# Patient Record
Sex: FEMALE | Race: WHITE
[De-identification: names, ages, dates, MRNs, and addresses within clinical notes are randomized per-mention and may not be internally consistent; named-entity substitution may affect disease eponyms.]

---

## 2017-08-24 ENCOUNTER — HOSPITAL ENCOUNTER (OUTPATIENT)
Dept: HOSPITAL 19 - COL.CARD | Age: 62
End: 2017-08-24
Attending: INTERNAL MEDICINE
Payer: MEDICAID

## 2017-08-24 VITALS — SYSTOLIC BLOOD PRESSURE: 167 MMHG | HEART RATE: 137 BPM | DIASTOLIC BLOOD PRESSURE: 86 MMHG

## 2017-08-24 VITALS — DIASTOLIC BLOOD PRESSURE: 77 MMHG | HEART RATE: 106 BPM | SYSTOLIC BLOOD PRESSURE: 128 MMHG

## 2017-08-24 VITALS — BODY MASS INDEX: 26.87 KG/M2 | WEIGHT: 198.42 LBS | HEIGHT: 72 IN

## 2017-08-24 VITALS — HEART RATE: 122 BPM | SYSTOLIC BLOOD PRESSURE: 148 MMHG | DIASTOLIC BLOOD PRESSURE: 80 MMHG

## 2017-08-24 VITALS — DIASTOLIC BLOOD PRESSURE: 82 MMHG | SYSTOLIC BLOOD PRESSURE: 131 MMHG | HEART RATE: 105 BPM

## 2017-08-24 DIAGNOSIS — Z01.810: Primary | ICD-10-CM

## 2017-08-24 PROCEDURE — A9502 TC99M TETROFOSMIN: HCPCS

## 2018-01-16 VITALS — HEART RATE: 88 BPM | SYSTOLIC BLOOD PRESSURE: 165 MMHG | DIASTOLIC BLOOD PRESSURE: 83 MMHG | TEMPERATURE: 97.8 F

## 2018-01-16 VITALS — DIASTOLIC BLOOD PRESSURE: 73 MMHG | TEMPERATURE: 98.3 F | HEART RATE: 88 BPM | SYSTOLIC BLOOD PRESSURE: 110 MMHG

## 2018-01-18 VITALS — HEART RATE: 80 BPM | SYSTOLIC BLOOD PRESSURE: 85 MMHG | TEMPERATURE: 98.1 F | DIASTOLIC BLOOD PRESSURE: 50 MMHG

## 2018-01-18 LAB
ANISOCYTOSIS BLD QL: (no result)
EOSINOPHIL NFR BLD: 7 % (ref 0–4)
ERYTHROCYTE [DISTWIDTH] IN BLOOD BY AUTOMATED COUNT: 14.8 % (ref 11.5–14.5)
HCT VFR BLD AUTO: 42.5 % (ref 37–47)
HGB BLD-MCNC: 14.2 G/DL (ref 12.5–16)
LYMPHOCYTES NFR BLD MANUAL: 31 % (ref 20–51)
MACROCYTES BLD QL SMEAR: (no result)
MCH RBC QN AUTO: 34 PG (ref 27–31)
MCHC RBC AUTO-ENTMCNC: 33 G/DL (ref 33–37)
MCV RBC AUTO: 100 FL (ref 80–100)
MONOCYTES NFR BLD: 1 % (ref 1.7–9.3)
NEUTS BAND NFR BLD: 7 % (ref 0–10)
NEUTS SEG NFR BLD MANUAL: 54 % (ref 42–75.2)
PLATELET # BLD AUTO: 253 K/MM3 (ref 130–400)
PLATELET BLD QL SMEAR: NORMAL
PMV BLD AUTO: 9.6 FL (ref 7.4–10.4)
RBC # BLD AUTO: 4.24 M/MM3 (ref 4.1–5.3)

## 2018-01-22 VITALS — TEMPERATURE: 98.3 F | DIASTOLIC BLOOD PRESSURE: 61 MMHG | SYSTOLIC BLOOD PRESSURE: 98 MMHG | HEART RATE: 92 BPM

## 2018-01-26 VITALS — TEMPERATURE: 98.3 F | SYSTOLIC BLOOD PRESSURE: 105 MMHG | DIASTOLIC BLOOD PRESSURE: 65 MMHG | HEART RATE: 88 BPM

## 2018-01-26 LAB
ALBUMIN SERPL-MCNC: 3.3 GM/DL (ref 3.5–5)
ALP SERPL-CCNC: 69 U/L (ref 50–136)
ALT SERPL-CCNC: 15 U/L (ref 9–52)
ANION GAP SERPL CALC-SCNC: 6 MMOL/L (ref 7–16)
AST SERPL-CCNC: 19 U/L (ref 15–37)
BILIRUB SERPL-MCNC: 0.4 MG/DL (ref 0–1)
BUN SERPL-MCNC: 6 MG/DL (ref 7–17)
CALCIUM SERPL-MCNC: 8.8 MG/DL (ref 8.4–10.2)
CHLORIDE SERPL-SCNC: 102 MMOL/L (ref 98–107)
CO2 SERPL-SCNC: 25 MMOL/L (ref 22–30)
CREAT SERPL-SCNC: 0.51 MG/DL (ref 0.52–1.25)
EOSINOPHIL NFR BLD: 3 % (ref 0–4)
ERYTHROCYTE [DISTWIDTH] IN BLOOD BY AUTOMATED COUNT: 15.3 % (ref 11.5–14.5)
GLUCOSE SERPL-MCNC: 101 MG/DL (ref 74–106)
HCT VFR BLD AUTO: 39.9 % (ref 37–47)
HGB BLD-MCNC: 13.5 G/DL (ref 12.5–16)
LDH SERPL-CCNC: 515 U/L (ref 313–618)
LYMPHOCYTES NFR BLD MANUAL: 34 % (ref 20–51)
MCH RBC QN AUTO: 34 PG (ref 27–31)
MCHC RBC AUTO-ENTMCNC: 34 G/DL (ref 33–37)
MCV RBC AUTO: 100 FL (ref 80–100)
MONOCYTES NFR BLD: 3 % (ref 1.7–9.3)
NEUTS BAND NFR BLD: 4 % (ref 0–10)
NEUTS SEG NFR BLD MANUAL: 56 % (ref 42–75.2)
PLATELET # BLD AUTO: 173 K/MM3 (ref 130–400)
PLATELET BLD QL SMEAR: NORMAL
PMV BLD AUTO: 9.2 FL (ref 7.4–10.4)
POTASSIUM SERPL-SCNC: 4.6 MMOL/L (ref 3.4–5)
PROT SERPL-MCNC: 6.3 GM/DL (ref 6.4–8.2)
RBC # BLD AUTO: 4.01 M/MM3 (ref 4.1–5.3)
SODIUM SERPL-SCNC: 133 MMOL/L (ref 137–145)

## 2018-02-02 VITALS — HEART RATE: 83 BPM | DIASTOLIC BLOOD PRESSURE: 59 MMHG | SYSTOLIC BLOOD PRESSURE: 99 MMHG | TEMPERATURE: 98.2 F

## 2018-02-02 LAB
BASOPHILS NFR BLD MANUAL: 2 % (ref 0–2)
EOSINOPHIL NFR BLD: 2 % (ref 0–4)
ERYTHROCYTE [DISTWIDTH] IN BLOOD BY AUTOMATED COUNT: 15.8 % (ref 11.5–14.5)
HCT VFR BLD AUTO: 37.2 % (ref 37–47)
HGB BLD-MCNC: 12.7 G/DL (ref 12.5–16)
LYMPHOCYTES NFR BLD MANUAL: 20 % (ref 20–51)
MCH RBC QN AUTO: 34 PG (ref 27–31)
MCHC RBC AUTO-ENTMCNC: 34 G/DL (ref 33–37)
MCV RBC AUTO: 100 FL (ref 80–100)
MONOCYTES NFR BLD: 4 % (ref 1.7–9.3)
NEUTS BAND NFR BLD: 26 % (ref 0–10)
NEUTS SEG NFR BLD MANUAL: 46 % (ref 42–75.2)
PLATELET # BLD AUTO: 237 K/MM3 (ref 130–400)
PLATELET BLD QL SMEAR: NORMAL
PMV BLD AUTO: 9.3 FL (ref 7.4–10.4)
RBC # BLD AUTO: 3.72 M/MM3 (ref 4.1–5.3)

## 2018-02-16 VITALS — SYSTOLIC BLOOD PRESSURE: 178 MMHG | HEART RATE: 110 BPM | DIASTOLIC BLOOD PRESSURE: 90 MMHG | TEMPERATURE: 98 F

## 2018-02-16 LAB
ALBUMIN SERPL-MCNC: 3.5 GM/DL (ref 3.5–5)
ALP SERPL-CCNC: 69 U/L (ref 50–136)
ALT SERPL-CCNC: 19 U/L (ref 9–52)
ANION GAP SERPL CALC-SCNC: 5 MMOL/L (ref 7–16)
AST SERPL-CCNC: 14 U/L (ref 15–37)
BASOPHILS NFR BLD MANUAL: 1 % (ref 0–2)
BILIRUB SERPL-MCNC: 0.6 MG/DL (ref 0–1)
BUN SERPL-MCNC: 9 MG/DL (ref 7–17)
CALCIUM SERPL-MCNC: 8.6 MG/DL (ref 8.4–10.2)
CHLORIDE SERPL-SCNC: 101 MMOL/L (ref 98–107)
CO2 SERPL-SCNC: 27 MMOL/L (ref 22–30)
CREAT SERPL-SCNC: 0.49 MG/DL (ref 0.52–1.25)
EOSINOPHIL NFR BLD: 1 % (ref 0–4)
ERYTHROCYTE [DISTWIDTH] IN BLOOD BY AUTOMATED COUNT: 16 % (ref 11.5–14.5)
GLUCOSE SERPL-MCNC: 91 MG/DL (ref 74–106)
HCT VFR BLD AUTO: 39.6 % (ref 37–47)
HGB BLD-MCNC: 13.2 G/DL (ref 12.5–16)
LDH SERPL-CCNC: 401 U/L (ref 313–618)
LYMPHOCYTES NFR BLD MANUAL: 13 % (ref 20–51)
MCH RBC QN AUTO: 34 PG (ref 27–31)
MCHC RBC AUTO-ENTMCNC: 33 G/DL (ref 33–37)
MCV RBC AUTO: 101 FL (ref 80–100)
MONOCYTES NFR BLD: 4 % (ref 1.7–9.3)
NEUTS BAND NFR BLD: 13 % (ref 0–10)
NEUTS SEG NFR BLD MANUAL: 68 % (ref 42–75.2)
PLATELET # BLD AUTO: 219 K/MM3 (ref 130–400)
PLATELET BLD QL SMEAR: NORMAL
PMV BLD AUTO: 9 FL (ref 7.4–10.4)
POTASSIUM SERPL-SCNC: 4.3 MMOL/L (ref 3.4–5)
PROT SERPL-MCNC: 6.6 GM/DL (ref 6.4–8.2)
RBC # BLD AUTO: 3.93 M/MM3 (ref 4.1–5.3)
SODIUM SERPL-SCNC: 133 MMOL/L (ref 137–145)

## 2018-02-23 VITALS — SYSTOLIC BLOOD PRESSURE: 112 MMHG | TEMPERATURE: 98.3 F | DIASTOLIC BLOOD PRESSURE: 68 MMHG | HEART RATE: 92 BPM

## 2018-03-02 VITALS — TEMPERATURE: 98 F | DIASTOLIC BLOOD PRESSURE: 65 MMHG | SYSTOLIC BLOOD PRESSURE: 110 MMHG | HEART RATE: 101 BPM

## 2018-03-02 LAB
ALBUMIN SERPL-MCNC: 3 GM/DL (ref 3.5–5)
ALP SERPL-CCNC: 73 U/L (ref 50–136)
ALT SERPL-CCNC: 18 U/L (ref 9–52)
ANION GAP SERPL CALC-SCNC: 5 MMOL/L (ref 7–16)
ANISOCYTOSIS BLD QL: (no result)
AST SERPL-CCNC: 17 U/L (ref 15–37)
BILIRUB SERPL-MCNC: 0.4 MG/DL (ref 0–1)
BUN SERPL-MCNC: 7 MG/DL (ref 7–17)
CALCIUM SERPL-MCNC: 8.3 MG/DL (ref 8.4–10.2)
CHLORIDE SERPL-SCNC: 102 MMOL/L (ref 98–107)
CO2 SERPL-SCNC: 27 MMOL/L (ref 22–30)
CREAT SERPL-SCNC: 0.46 MG/DL (ref 0.52–1.25)
ERYTHROCYTE [DISTWIDTH] IN BLOOD BY AUTOMATED COUNT: 16.5 % (ref 11.5–14.5)
GLUCOSE SERPL-MCNC: 90 MG/DL (ref 74–106)
HCT VFR BLD AUTO: 37.8 % (ref 37–47)
HGB BLD-MCNC: 12.8 G/DL (ref 12.5–16)
LDH SERPL-CCNC: 490 U/L (ref 313–618)
LYMPHOCYTES NFR BLD MANUAL: 13 % (ref 20–51)
MACROCYTES BLD QL SMEAR: (no result)
MCH RBC QN AUTO: 34 PG (ref 27–31)
MCHC RBC AUTO-ENTMCNC: 34 G/DL (ref 33–37)
MCV RBC AUTO: 102 FL (ref 80–100)
NEUTS BAND NFR BLD: 9 % (ref 0–10)
NEUTS SEG NFR BLD MANUAL: 78 % (ref 42–75.2)
OVALOCYTES BLD QL SMEAR: (no result)
PLATELET # BLD AUTO: 230 K/MM3 (ref 130–400)
PLATELET BLD QL SMEAR: NORMAL
PMV BLD AUTO: 9.2 FL (ref 7.4–10.4)
POTASSIUM SERPL-SCNC: 4.8 MMOL/L (ref 3.4–5)
PROT SERPL-MCNC: 6.1 GM/DL (ref 6.4–8.2)
RBC # BLD AUTO: 3.72 M/MM3 (ref 4.1–5.3)
SODIUM SERPL-SCNC: 133 MMOL/L (ref 137–145)

## 2018-03-09 VITALS — SYSTOLIC BLOOD PRESSURE: 108 MMHG | HEART RATE: 81 BPM | DIASTOLIC BLOOD PRESSURE: 60 MMHG | TEMPERATURE: 97 F

## 2018-03-09 LAB
ANISOCYTOSIS BLD QL: (no result)
EOSINOPHIL NFR BLD: 6 % (ref 0–4)
ERYTHROCYTE [DISTWIDTH] IN BLOOD BY AUTOMATED COUNT: 16.6 % (ref 11.5–14.5)
HCT VFR BLD AUTO: 37.7 % (ref 37–47)
HGB BLD-MCNC: 12.7 G/DL (ref 12.5–16)
LYMPHOCYTES NFR BLD MANUAL: 14 % (ref 20–51)
MCH RBC QN AUTO: 34 PG (ref 27–31)
MCHC RBC AUTO-ENTMCNC: 34 G/DL (ref 33–37)
MCV RBC AUTO: 102 FL (ref 80–100)
MONOCYTES NFR BLD: 17 % (ref 1.7–9.3)
NEUTS BAND NFR BLD: 10 % (ref 0–10)
NEUTS SEG NFR BLD MANUAL: 53 % (ref 42–75.2)
PLATELET # BLD AUTO: 269 K/MM3 (ref 130–400)
PLATELET BLD QL SMEAR: NORMAL
PMV BLD AUTO: 9.2 FL (ref 7.4–10.4)
RBC # BLD AUTO: 3.7 M/MM3 (ref 4.1–5.3)

## 2018-03-16 VITALS — DIASTOLIC BLOOD PRESSURE: 64 MMHG | SYSTOLIC BLOOD PRESSURE: 109 MMHG | TEMPERATURE: 98.1 F | HEART RATE: 87 BPM

## 2018-03-16 LAB
ANISOCYTOSIS BLD QL: (no result)
EOSINOPHIL NFR BLD: 2 % (ref 0–4)
ERYTHROCYTE [DISTWIDTH] IN BLOOD BY AUTOMATED COUNT: 16.3 % (ref 11.5–14.5)
HCT VFR BLD AUTO: 36.5 % (ref 37–47)
HGB BLD-MCNC: 12.4 G/DL (ref 12.5–16)
HYPOCHROMIA BLD QL SMEAR: (no result)
LYMPHOCYTES NFR BLD MANUAL: 29 % (ref 20–51)
MCH RBC QN AUTO: 35 PG (ref 27–31)
MCHC RBC AUTO-ENTMCNC: 34 G/DL (ref 33–37)
MCV RBC AUTO: 103 FL (ref 80–100)
METAMYELOCYTES NFR BLD MANUAL: 1 % (ref 0–0)
MONOCYTES NFR BLD: 8 % (ref 1.7–9.3)
NEUTS BAND NFR BLD: 3 % (ref 0–10)
NEUTS SEG NFR BLD MANUAL: 57 % (ref 42–75.2)
PLATELET # BLD AUTO: 241 K/MM3 (ref 130–400)
PLATELET BLD QL SMEAR: NORMAL
PMV BLD AUTO: 9.1 FL (ref 7.4–10.4)
RBC # BLD AUTO: 3.55 M/MM3 (ref 4.1–5.3)

## 2018-03-23 VITALS — HEART RATE: 101 BPM | TEMPERATURE: 98.2 F | DIASTOLIC BLOOD PRESSURE: 74 MMHG | SYSTOLIC BLOOD PRESSURE: 105 MMHG

## 2018-03-23 LAB
ALBUMIN SERPL-MCNC: 3.4 GM/DL (ref 3.5–5)
ALP SERPL-CCNC: 74 U/L (ref 50–136)
ALT SERPL-CCNC: 17 U/L (ref 9–52)
ANION GAP SERPL CALC-SCNC: 9 MMOL/L (ref 7–16)
AST SERPL-CCNC: 22 U/L (ref 15–37)
BILIRUB SERPL-MCNC: 0.6 MG/DL (ref 0–1)
BUN SERPL-MCNC: 9 MG/DL (ref 7–17)
CALCIUM SERPL-MCNC: 8.9 MG/DL (ref 8.4–10.2)
CHLORIDE SERPL-SCNC: 100 MMOL/L (ref 98–107)
CO2 SERPL-SCNC: 28 MMOL/L (ref 22–30)
CREAT SERPL-SCNC: 0.5 MG/DL (ref 0.52–1.25)
EOSINOPHIL NFR BLD: 4 % (ref 0–4)
ERYTHROCYTE [DISTWIDTH] IN BLOOD BY AUTOMATED COUNT: 16.8 % (ref 11.5–14.5)
GLUCOSE SERPL-MCNC: 100 MG/DL (ref 74–106)
HCT VFR BLD AUTO: 38.7 % (ref 37–47)
HGB BLD-MCNC: 13 G/DL (ref 12.5–16)
LDH SERPL-CCNC: 513 U/L (ref 313–618)
LYMPHOCYTES NFR BLD MANUAL: 16 % (ref 20–51)
MCH RBC QN AUTO: 35 PG (ref 27–31)
MCHC RBC AUTO-ENTMCNC: 34 G/DL (ref 33–37)
MCV RBC AUTO: 103 FL (ref 80–100)
MONOCYTES NFR BLD: 10 % (ref 1.7–9.3)
NEUTS BAND NFR BLD: 9 % (ref 0–10)
NEUTS SEG NFR BLD MANUAL: 61 % (ref 42–75.2)
PLATELET # BLD AUTO: 278 K/MM3 (ref 130–400)
PLATELET BLD QL SMEAR: NORMAL
PMV BLD AUTO: 9 FL (ref 7.4–10.4)
POTASSIUM SERPL-SCNC: 4.9 MMOL/L (ref 3.4–5)
PROT SERPL-MCNC: 7 GM/DL (ref 6.4–8.2)
RBC # BLD AUTO: 3.76 M/MM3 (ref 4.1–5.3)
SODIUM SERPL-SCNC: 137 MMOL/L (ref 137–145)

## 2018-04-03 VITALS — TEMPERATURE: 97.8 F | DIASTOLIC BLOOD PRESSURE: 68 MMHG | SYSTOLIC BLOOD PRESSURE: 118 MMHG | HEART RATE: 68 BPM

## 2018-04-03 LAB
BASOPHILS NFR BLD MANUAL: 1 % (ref 0–2)
EOSINOPHIL NFR BLD: 10 % (ref 0–4)
ERYTHROCYTE [DISTWIDTH] IN BLOOD BY AUTOMATED COUNT: 15.9 % (ref 11.5–14.5)
HCT VFR BLD AUTO: 39.6 % (ref 37–47)
HGB BLD-MCNC: 13.2 G/DL (ref 12.5–16)
LYMPHOCYTES NFR BLD MANUAL: 24 % (ref 20–51)
MCH RBC QN AUTO: 35 PG (ref 27–31)
MCHC RBC AUTO-ENTMCNC: 33 G/DL (ref 33–37)
MCV RBC AUTO: 104 FL (ref 80–100)
MONOCYTES NFR BLD: 3 % (ref 1.7–9.3)
NEUTS BAND NFR BLD: 1 % (ref 0–10)
NEUTS SEG NFR BLD MANUAL: 61 % (ref 42–75.2)
PLATELET # BLD AUTO: 251 K/MM3 (ref 130–400)
PLATELET BLD QL SMEAR: NORMAL
PMV BLD AUTO: 9.3 FL (ref 7.4–10.4)
RBC # BLD AUTO: 3.82 M/MM3 (ref 4.1–5.3)

## 2018-04-10 ENCOUNTER — HOSPITAL ENCOUNTER (OUTPATIENT)
Dept: HOSPITAL 19 - EUO | Age: 63
Discharge: HOME | End: 2018-04-10
Payer: MEDICAID

## 2018-04-10 VITALS — SYSTOLIC BLOOD PRESSURE: 111 MMHG | TEMPERATURE: 97.9 F | HEART RATE: 92 BPM | DIASTOLIC BLOOD PRESSURE: 65 MMHG

## 2018-04-10 VITALS — WEIGHT: 165.13 LBS | HEIGHT: 70 IN | BODY MASS INDEX: 23.64 KG/M2

## 2018-04-10 DIAGNOSIS — J44.9: ICD-10-CM

## 2018-04-10 DIAGNOSIS — I10: ICD-10-CM

## 2018-04-10 DIAGNOSIS — C20: Primary | ICD-10-CM

## 2018-04-10 LAB
ALBUMIN SERPL-MCNC: 3.4 GM/DL (ref 3.5–5)
ALP SERPL-CCNC: 72 U/L (ref 50–136)
ALT SERPL-CCNC: 13 U/L (ref 9–52)
ANION GAP SERPL CALC-SCNC: 9 MMOL/L (ref 7–16)
ANISOCYTOSIS BLD QL: (no result)
AST SERPL-CCNC: 22 U/L (ref 15–37)
BILIRUB SERPL-MCNC: 0.4 MG/DL (ref 0–1)
BUN SERPL-MCNC: 14 MG/DL (ref 7–17)
CALCIUM SERPL-MCNC: 8.8 MG/DL (ref 8.4–10.2)
CHLORIDE SERPL-SCNC: 101 MMOL/L (ref 98–107)
CO2 SERPL-SCNC: 26 MMOL/L (ref 22–30)
CREAT SERPL-SCNC: 0.48 MG/DL (ref 0.52–1.25)
EOSINOPHIL NFR BLD: 7 % (ref 0–4)
ERYTHROCYTE [DISTWIDTH] IN BLOOD BY AUTOMATED COUNT: 15.2 % (ref 11.5–14.5)
GLUCOSE SERPL-MCNC: 95 MG/DL (ref 74–106)
HCT VFR BLD AUTO: 38.7 % (ref 37–47)
HGB BLD-MCNC: 12.7 G/DL (ref 12.5–16)
LDH SERPL-CCNC: 486 U/L (ref 313–618)
LYMPHOCYTES NFR BLD MANUAL: 12 % (ref 20–51)
MCH RBC QN AUTO: 34 PG (ref 27–31)
MCHC RBC AUTO-ENTMCNC: 33 G/DL (ref 33–37)
MCV RBC AUTO: 105 FL (ref 80–100)
MONOCYTES NFR BLD: 12 % (ref 1.7–9.3)
NEUTS BAND NFR BLD: 2 % (ref 0–10)
NEUTS SEG NFR BLD MANUAL: 67 % (ref 42–75.2)
PLATELET # BLD AUTO: 290 K/MM3 (ref 130–400)
PLATELET BLD QL SMEAR: NORMAL
PMV BLD AUTO: 9.4 FL (ref 7.4–10.4)
POTASSIUM SERPL-SCNC: 4.6 MMOL/L (ref 3.4–5)
PROT SERPL-MCNC: 7.2 GM/DL (ref 6.4–8.2)
RBC # BLD AUTO: 3.7 M/MM3 (ref 4.1–5.3)
SODIUM SERPL-SCNC: 136 MMOL/L (ref 137–145)

## 2018-04-10 PROCEDURE — C1751 CATH, INF, PER/CENT/MIDLINE: HCPCS

## 2018-04-16 ENCOUNTER — HOSPITAL ENCOUNTER (OUTPATIENT)
Dept: HOSPITAL 19 - EUO | Age: 63
LOS: 8 days | Discharge: HOME | End: 2018-04-24
Payer: MEDICAID

## 2018-04-16 VITALS — DIASTOLIC BLOOD PRESSURE: 50 MMHG | HEART RATE: 103 BPM | TEMPERATURE: 98.1 F | SYSTOLIC BLOOD PRESSURE: 113 MMHG

## 2018-04-16 VITALS — WEIGHT: 158.73 LBS | BODY MASS INDEX: 22.72 KG/M2 | HEIGHT: 70 IN

## 2018-04-16 DIAGNOSIS — Z45.2: Primary | ICD-10-CM

## 2018-04-16 LAB
ANISOCYTOSIS BLD QL: (no result)
EOSINOPHIL NFR BLD: 15 % (ref 0–4)
ERYTHROCYTE [DISTWIDTH] IN BLOOD BY AUTOMATED COUNT: 14.5 % (ref 11.5–14.5)
HCT VFR BLD AUTO: 38.9 % (ref 37–47)
HGB BLD-MCNC: 13.1 G/DL (ref 12.5–16)
LYMPHOCYTES NFR BLD MANUAL: 18 % (ref 20–51)
MACROCYTES BLD QL SMEAR: (no result)
MCH RBC QN AUTO: 35 PG (ref 27–31)
MCHC RBC AUTO-ENTMCNC: 34 G/DL (ref 33–37)
MCV RBC AUTO: 103 FL (ref 80–100)
MICROCYTES BLD QL SMEAR: (no result)
MONOCYTES NFR BLD: 3 % (ref 1.7–9.3)
NEUTS BAND NFR BLD: 3 % (ref 0–10)
NEUTS SEG NFR BLD MANUAL: 61 % (ref 42–75.2)
PLATELET # BLD AUTO: 282 K/MM3 (ref 130–400)
PLATELET BLD QL SMEAR: NORMAL
PMV BLD AUTO: 9.2 FL (ref 7.4–10.4)
RBC # BLD AUTO: 3.77 M/MM3 (ref 4.1–5.3)

## 2018-04-24 VITALS — SYSTOLIC BLOOD PRESSURE: 120 MMHG | HEART RATE: 92 BPM | DIASTOLIC BLOOD PRESSURE: 55 MMHG | TEMPERATURE: 98 F

## 2018-04-24 LAB
ANISOCYTOSIS BLD QL: (no result)
EOSINOPHIL NFR BLD: 6 % (ref 0–4)
ERYTHROCYTE [DISTWIDTH] IN BLOOD BY AUTOMATED COUNT: 13.4 % (ref 11.5–14.5)
HCT VFR BLD AUTO: 41 % (ref 37–47)
HGB BLD-MCNC: 14.2 G/DL (ref 12.5–16)
LYMPHOCYTES NFR BLD MANUAL: 26 % (ref 20–51)
MCH RBC QN AUTO: 34 PG (ref 27–31)
MCHC RBC AUTO-ENTMCNC: 35 G/DL (ref 33–37)
MCV RBC AUTO: 99 FL (ref 80–100)
MONOCYTES NFR BLD: 7 % (ref 1.7–9.3)
NEUTS BAND NFR BLD: 2 % (ref 0–10)
NEUTS SEG NFR BLD MANUAL: 59 % (ref 42–75.2)
PLATELET # BLD AUTO: 291 K/MM3 (ref 130–400)
PLATELET BLD QL SMEAR: NORMAL
PMV BLD AUTO: 9.2 FL (ref 7.4–10.4)
RBC # BLD AUTO: 4.13 M/MM3 (ref 4.1–5.3)

## 2022-06-24 ENCOUNTER — HOSPITAL ENCOUNTER (OUTPATIENT)
Dept: HOSPITAL 19 - SDCO | Age: 67
Discharge: HOME | End: 2022-06-24
Attending: SURGERY
Payer: MEDICARE

## 2022-06-24 VITALS — TEMPERATURE: 97.9 F | DIASTOLIC BLOOD PRESSURE: 53 MMHG | HEART RATE: 82 BPM | SYSTOLIC BLOOD PRESSURE: 109 MMHG

## 2022-06-24 VITALS — WEIGHT: 130.29 LBS | HEIGHT: 67.99 IN | BODY MASS INDEX: 19.75 KG/M2

## 2022-06-24 VITALS — SYSTOLIC BLOOD PRESSURE: 115 MMHG | HEART RATE: 87 BPM | DIASTOLIC BLOOD PRESSURE: 62 MMHG

## 2022-06-24 VITALS — SYSTOLIC BLOOD PRESSURE: 104 MMHG | HEART RATE: 84 BPM | DIASTOLIC BLOOD PRESSURE: 76 MMHG

## 2022-06-24 DIAGNOSIS — C20: Primary | ICD-10-CM

## 2022-06-24 DIAGNOSIS — Z79.899: ICD-10-CM

## 2022-06-24 DIAGNOSIS — F17.210: ICD-10-CM

## 2022-06-24 DIAGNOSIS — J44.9: ICD-10-CM

## 2022-06-24 PROCEDURE — C1788 PORT, INDWELLING, IMP: HCPCS

## 2022-06-24 NOTE — NUR
Drinking black coffee, eating applesauce, and water.  Cursing at spouse and
becoming agitated with him.  Spouse walked out of room.  Tolerates snack.

## 2022-06-24 NOTE — NUR
Patient dismissal instructions given and voices understanding.  Spouse returns
to room.  Patient assisted into wheelchair.  Spouse remains in the room.

## 2022-06-24 NOTE — NUR
Patient returns to room 5 per cart from surgery accompanied by Chavez RUIZ and is
awake and alert.  Dressing clean and dry on the left chest port a catheter
site.  IV fluids infusing and site is free of redness or swelling.  Siderails
up x2 and call light in reach.

## 2022-06-24 NOTE — NUR
Taken to the cafeteria by spouse per and he pushed patient in wheelchair.
Escorted by Fatou RUIZ.  Patient and spouse will call public transportation when
ready to be discharged back to [place].

## 2022-06-24 NOTE — NUR
IV discontinued and site is free of redness or swelling.  States that she is
going to the cafeteria to eat.  Remains agitated.

## 2022-08-04 ENCOUNTER — HOSPITAL ENCOUNTER (EMERGENCY)
Dept: HOSPITAL 19 - COL.ER | Age: 67
End: 2022-08-04
Payer: MEDICARE

## 2022-08-04 VITALS — DIASTOLIC BLOOD PRESSURE: 109 MMHG | SYSTOLIC BLOOD PRESSURE: 242 MMHG

## 2022-08-04 DIAGNOSIS — I46.9: Primary | ICD-10-CM

## 2022-08-04 NOTE — NUR
Initial encounter; Emergency Services called with a message of CODE BLUE.
 went to  where Elaine was being worked with. When her 'Life
Partner' Raj arrived  attempted to help him when the Physician
explained that Elaine had . Patient and  eventually went in to
view patient's body;  offered the [facility] and when Raj was
ready to go stayed with him throughout the process of giving information and
finally assisted him into the Uber that  had called for him. He
thanked  and  for all their help. We offered our
condolences.

## 2022-08-04 NOTE — NUR
responded to ED for code blue for patient brought in via EMS
from the [facility] by [facility] de j[facility]us here in [place]. Per EMR, patient's
address is in [place], however she and her life partner, Raj had
stayed overnight at the hotel as she had chemotherapy yesterday at [facility] here in [place]. SW was told by EMS that they were unsure if
patient's partner was in route. SW contacted Raj and stated that she was
calling about patient, Elaine Moore. Raj confirmed he was the life
partner and that he was packing up his belongings at the hotel and would be to
the ED in a couple hours. MATHEW advised there had been a change in patient's
status and we needed him here as soon as possible. Raj said he did not
have a ride as [employer] can't pick him up until later. MATHEW advised she would
schedule an Uber for patient and call back with a  time. Raj
advised he needed at least half an hour to pack up his belongings. MATHEW advised
Raj that the situation was emergent and we needed him to arrive sooner
than that. Raj verbalized understanding. MATHEW scheduled an Uber then
contacted Raj with  time. MATHEW also advised Raj that House
Supervisor contacted the hotel and explained to management that patient will
require a later check out as he is needed in the ED for an emergency.
 
Raj arrived and MATHEW and  escorted him to the family waiting room
where the ED Physician provided Raj the update that patient had passed.
Raj was tearful and stated he figured after he was contacted to get here
quickly. Raj stated he and patient are not  however they live
together and have been together for twenty years. Raj advised that
patient's parents are  and that both of her children  by suicide.
Raj reported that patient has two living sisters, one of which is in a
nursing home for "alcohol dementia". Patient's other sister, Micheline Martinez
(ph#[phone]) lives in [place]. Raj attempted to contact Micheline and
left her a message. MATHEW also attempted to call her and left a message. Raj
stated that patient wanted to be cremated and he would like to use the least
expensive option as he does not have much money. MATHEW advised that [facility]
was the least expensive option and Raj was agreeable to this. MATHEW updated
House Supervisor. Raj took patient's jewlery that she was wearing and
stated he was going to make sure this got to her grandchildren. Raj
advised he has a friend meeting him at the hotel to provide support and a ride
back to [place]. Before leaving, Raj advised he got a hold of Micheline
and told her the news. MATHEW arranged an uber for Raj to get back to the
hotel. MATHEW received a message from Micheline, so MATHEW attempted to contact Micheline back
and left her another message. No call back has been received at this time.